# Patient Record
Sex: FEMALE | Race: WHITE | Employment: UNEMPLOYED | URBAN - METROPOLITAN AREA
[De-identification: names, ages, dates, MRNs, and addresses within clinical notes are randomized per-mention and may not be internally consistent; named-entity substitution may affect disease eponyms.]

---

## 2021-05-12 PROBLEM — R20.2 PARESTHESIA: Status: ACTIVE | Noted: 2021-05-12

## 2021-05-12 PROBLEM — Z79.899 ENCOUNTER FOR MEDICATION MANAGEMENT: Status: ACTIVE | Noted: 2021-05-12

## 2021-05-12 PROBLEM — M79.10 MYALGIA: Status: ACTIVE | Noted: 2021-05-12

## 2021-05-12 PROBLEM — G25.81 RESTLESS LEGS SYNDROME (RLS): Status: ACTIVE | Noted: 2021-05-12

## 2021-06-09 ENCOUNTER — HOSPITAL ENCOUNTER (OUTPATIENT)
Dept: MRI IMAGING | Age: 56
Discharge: HOME | End: 2021-06-09
Attending: PSYCHIATRY & NEUROLOGY
Payer: COMMERCIAL

## 2021-06-09 DIAGNOSIS — R20.2 PARESTHESIA: ICD-10-CM

## 2021-06-09 PROCEDURE — 72141 MRI NECK SPINE W/O DYE: CPT

## 2021-06-09 NOTE — PROGRESS NOTES
Results MRI reveal arthritis cervical neck - with some degenerative chagnes . Next step is EMG NCV testing which should already be schedule. No change Rx to recommend yet.          Leanna Jewell MD  Consultative Neurology, Neurodiagnostics and Neurotherapeutics  Neuroelectrophysiology, EEG, EMG  [facility]  [address] [facility]  [facility] [place], [address] [facility]  Phone:  [phone]  Fax:   [phone]

## 2021-06-17 PROBLEM — G56.01 RIGHT CARPAL TUNNEL SYNDROME: Status: ACTIVE | Noted: 2021-06-17

## 2021-06-17 PROBLEM — R20.0 NUMBNESS AND TINGLING IN RIGHT HAND: Status: ACTIVE | Noted: 2021-06-17

## 2021-06-17 PROBLEM — R20.2 NUMBNESS AND TINGLING IN RIGHT HAND: Status: ACTIVE | Noted: 2021-06-17

## 2021-07-15 PROBLEM — R53.1 WEAKNESS: Status: ACTIVE | Noted: 2021-07-15

## 2021-07-15 PROBLEM — M54.2 CERVICALGIA: Status: ACTIVE | Noted: 2021-07-15

## 2021-07-15 PROBLEM — R68.89 ABNORMAL CLINICAL FINDING: Status: ACTIVE | Noted: 2021-07-15

## 2022-03-18 PROBLEM — Z79.899 ENCOUNTER FOR MEDICATION MANAGEMENT: Status: ACTIVE | Noted: 2021-05-12

## 2022-03-18 PROBLEM — R53.1 WEAKNESS: Status: ACTIVE | Noted: 2021-07-15

## 2022-03-18 PROBLEM — R20.2 PARESTHESIA: Status: ACTIVE | Noted: 2021-05-12

## 2022-03-19 PROBLEM — R20.2 NUMBNESS AND TINGLING IN RIGHT HAND: Status: ACTIVE | Noted: 2021-06-17

## 2022-03-19 PROBLEM — R68.89 ABNORMAL CLINICAL FINDING: Status: ACTIVE | Noted: 2021-07-15

## 2022-03-19 PROBLEM — R20.0 NUMBNESS AND TINGLING IN RIGHT HAND: Status: ACTIVE | Noted: 2021-06-17

## 2022-03-19 PROBLEM — G56.01 RIGHT CARPAL TUNNEL SYNDROME: Status: ACTIVE | Noted: 2021-06-17

## 2022-03-19 PROBLEM — M54.2 CERVICALGIA: Status: ACTIVE | Noted: 2021-07-15

## 2022-03-20 PROBLEM — M79.10 MYALGIA: Status: ACTIVE | Noted: 2021-05-12

## 2022-03-20 PROBLEM — G25.81 RESTLESS LEGS SYNDROME (RLS): Status: ACTIVE | Noted: 2021-05-12

## 2022-07-18 ENCOUNTER — OFFICE VISIT (OUTPATIENT)
Dept: NEUROLOGY | Age: 57
End: 2022-07-18
Payer: COMMERCIAL

## 2022-07-18 VITALS
WEIGHT: 167 LBS | HEART RATE: 69 BPM | HEIGHT: 68 IN | BODY MASS INDEX: 25.31 KG/M2 | SYSTOLIC BLOOD PRESSURE: 120 MMHG | DIASTOLIC BLOOD PRESSURE: 66 MMHG

## 2022-07-18 DIAGNOSIS — G56.01 RIGHT CARPAL TUNNEL SYNDROME: Primary | ICD-10-CM

## 2022-07-18 DIAGNOSIS — Z79.899 ENCOUNTER FOR MEDICATION MANAGEMENT: ICD-10-CM

## 2022-07-18 DIAGNOSIS — G25.81 RESTLESS LEGS SYNDROME (RLS): ICD-10-CM

## 2022-07-18 PROCEDURE — 99214 OFFICE O/P EST MOD 30 MIN: CPT | Performed by: PSYCHIATRY & NEUROLOGY

## 2022-07-18 ASSESSMENT — ENCOUNTER SYMPTOMS
RESPIRATORY NEGATIVE: 1
GASTROINTESTINAL NEGATIVE: 1
EYES NEGATIVE: 1

## 2022-07-18 NOTE — PROGRESS NOTES
NEUROLOGY  RETURN  OFFICE VISIT [de-identified]                     7/18/2022  Mikayla Interiano is a 62 y.o. 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 Last year she had diagnosed significant right carpal tunnel syndrome and was referred over to surgery. However she wished to delay surgery and never got back into the schedule. She is returning because of continued problem there. However some of the symptoms have dissipated. She only partially are incompletely wears a splint or exercises. Neurologic Problem  The patient's pertinent negatives include no focal weakness or weakness. Pertinent negatives include no dizziness or headaches. Active Problems:    * No active hospital problems. *  Resolved Problems:    * No resolved hospital problems.  *    Past Medical History:   Diagnosis Date    Depression     Hyperlipidemia     Myalgia      Past Surgical History:   Procedure Laterality Date    COLONOSCOPY      HYSTERECTOMY (CERVIX STATUS UNKNOWN)  2010    OVARY REMOVAL        Family History   Problem Relation Age of Onset    Hypertension Father     Diabetes Father     Other Mother         ALS      Social History     Socioeconomic History    Marital status:      Spouse name: None    Number of children: None    Years of education: None    Highest education level: None   Tobacco Use    Smoking status: Never    Smokeless tobacco: Never   Substance and Sexual Activity    Alcohol use: Never    Drug use: Never        Current Outpatient Medications   Medication Sig Dispense Refill    buPROPion (WELLBUTRIN XL) 300 MG extended release tablet Take 300 mg by mouth daily      DULoxetine (CYMBALTA) 60 MG extended release capsule Take 60 mg by mouth daily       No current facility-administered medications for this visit. Allergies   Allergen Reactions    Codeine Other (See Comments)     GI upset       REVIEW OTHER RECORDS:    Review of Systems   Constitutional: Negative. HENT: Negative. Eyes: Negative. Respiratory: Negative. Cardiovascular: Negative. Gastrointestinal: Negative. Genitourinary: Negative. Musculoskeletal: Negative. Skin: Negative. Neurological:  Positive for tingling and sensory change. Negative for dizziness, tremors, speech change, focal weakness, seizures, loss of consciousness, weakness and headaches. Right hand. Endo/Heme/Allergies: Negative. Psychiatric/Behavioral: Negative. All other systems reviewed and are negative. REVIEW IMAGING: No new imaging. Objective:     Vitals:    07/18/22 1434   BP: 120/66   Site: Left Upper Arm   Position: Sitting   Pulse: 69   Weight: 167 lb (75.8 kg)   Height: 5' 8\" (1.727 m)        Physical Exam  Neurological:      Mental Status: She is alert. Mental status is at baseline. Cranial Nerves: Cranial nerves are intact. No cranial nerve deficit, dysarthria or facial asymmetry. Sensory: No sensory deficit (Equivocal.). Motor: No weakness, tremor, atrophy, abnormal muscle tone or seizure activity. Coordination: Coordination is intact. Gait: Gait is intact. Comments: Tinel negative. .  No Brian syndrome. PERRLA. No YANET. No fasciculations. No atrophy. Intrinsics of the hand are within normal limits. Strength of the intrinsics and of the thenar and hypothenar are normal.        Neurologic Exam     Mental Status   Attention: normal. Concentration: normal.   Level of consciousness: alert  Knowledge: consistent with education. Normal comprehension.      Gait, Coordination, and Reflexes     Gait  Gait: normal    Tremor   Resting tremor: absent  Intention tremor: absent  Action tremor: absent    Reflexes   Right Prasad: absent  Left Prasad: absentNails normal.  Javid negative. No spasticity or clonus or myoclonus. No abnormal movements. There is no tic, twitch, tonic or clonic activity noted. No dyskinesia. Assessment & Plan     Shravan Laureano was seen today for follow-up and neurologic problem. Diagnoses and all orders for this visit:    Right carpal tunnel syndrome    Restless legs syndrome (RLS)    Encounter for medication management    Best to repeat NCV right upper extremity. If continues to be moderate = refer back to orthopedics for release. All drugs and rx reviewed fully with patient and acknowledged specific to neurology as well. Differential diagnostic decisions and thoughts reviewed and discussed. Updating to patient identification, coordinate neurology visits, reasons for follow, review neurologic problems. Extensive time[de-identified]  Total time  30 minutes -       More than 50% of this visit was spent in counseling and care coordination. Treatment options fully reviewed with patient. Time includes pre-  and post- face-face time in records review, and preparation including available pertinent images and reports. Acknowledgements obtained where needed.    [ *NOTE: parts of this note were produced using artificial speech recognition software, which may have inadvertent errors in the produced wordings. ]          Brant Agudelo MD  Consultative Neurology, [address] [facility] [address]  Phone:  [phone]  Fax:   [phone]        Signed By: Brant Agudelo MD     July 18, 2022

## 2022-07-19 ENCOUNTER — OFFICE VISIT (OUTPATIENT)
Dept: NEUROLOGY | Age: 57
End: 2022-07-19
Payer: COMMERCIAL

## 2022-07-19 VITALS
HEART RATE: 55 BPM | DIASTOLIC BLOOD PRESSURE: 61 MMHG | BODY MASS INDEX: 24.63 KG/M2 | WEIGHT: 162 LBS | SYSTOLIC BLOOD PRESSURE: 111 MMHG

## 2022-07-19 DIAGNOSIS — R20.0 NUMBNESS AND TINGLING IN RIGHT HAND: ICD-10-CM

## 2022-07-19 DIAGNOSIS — R20.2 NUMBNESS AND TINGLING IN RIGHT HAND: ICD-10-CM

## 2022-07-19 DIAGNOSIS — G56.01 CARPAL TUNNEL SYNDROME, RIGHT: Primary | ICD-10-CM

## 2022-07-19 PROCEDURE — 95910 NRV CNDJ TEST 7-8 STUDIES: CPT | Performed by: PSYCHIATRY & NEUROLOGY

## 2022-07-19 PROCEDURE — 95885 MUSC TST DONE W/NERV TST LIM: CPT | Performed by: PSYCHIATRY & NEUROLOGY

## 2022-07-19 NOTE — PROGRESS NOTES
EMG/Nerve Conduction Study Procedure Note  [address] [facility], [address] [place] [address]   [phone]      Hx:    Exam:     62 y.o. RH female returning today for repeat EMG/NCV of the RIGHT upper extremity, previus study performed 6/17/2021 revealed significant CTS. She was referred over to surgery however she didn't want to schedule surgery at that time. Patient reports persistent numbness and tingling RIGHT hand. No neck pain. No hx of DM. She was just seen yesterday for this follow-up from a year ago when she had already been evaluated and was positive for CTS and saw Dr. Walter Never. EMG is compared to EMG from last year revealing carpal tunnel syndrome on 6/17/2021. Summary               EMG of selected and sampled muscles right hand. As below. Conduction velocity testing. Controlled environmental factors / EMG lab. Temperature. NCV : sensory segments:    Abnormal right median distal SCV is slowed moderately with good SNAP amplitudes. The right ulnar and right radial SCB SNAP normal.  NCV transcarpal sensory segments:     Abnormal = right median SCB SNAP transcarpal segments are moderately slowed with normal right ulnar transcarpal; the peak difference in latency is 1.02 ms the upper limit of normal at 0.20 ms. This is similar to last year's study. NCV Motor MCV segments:    Abnormal = right median to APB motor = the terminal latency is prolonged at 4.50 ms upper limit of normal at 4.15 ms this is similar to last year. Otherwise normal.  Right ulnar MCV is within normal limits. F-wave studies:            basically normal right median and ulnar F waves. NEEDLE EMG:   Tested muscles[de-identified]   Right FDI APB ADM = normal =   Normal insertional activity and interference pattern/recruitment. No fasciculations fibrillations positive sharp waves. Normal MUP. No BSS AP. No giant MUP. No myotonia. No upper motor neuron sign.           INTERPRETATION:    THESE FINDINGS ARE ELECTROPHYSIOLOGIC EVIDENCE OF ENTRAPMENT OF THE MEDIAN NERVE AT THE WRIST ON THE RIGHT SIDE. FAIRLY SIMILAR TO PREVIOUS TESTING LAST YEAR. NO MYOPATHY MYOTONIA OR FASCICULATIONS. CONCLUSION:      Compatible with moderate carpal tunnel syndrome. Procedure Details:       Patient fully aware = she will return to Dr. Jessica Cordoba for consideration of surgical remedy right carpal tunnel syndrome. Please Note[de-identified]     Data and waveforms * filed under Procedure category [facility]. See Procedure Files for complete data pages. Lizabeth Sanders MD  Consultative Neurology, Neurodiagnostics   [facility]    [address]   [place]  Phone:  [phone]  Fax:   [phone]          + + +   Glossary:   MUP: motor unit potential;  SNAP: sensory nerve action potential; Fibs:  Fibrillations; Fascic: fasciculations; IA: insertional activity;  IP: interference pattern;  SCV :  Sensory conduction velocity;  MCV: motor conduction velocity; NOTE[de-identified] muscles are abbreviated latin initials. [place] raw datafile[de-identified]   * filed at Procedure or [employer].  *